# Patient Record
Sex: FEMALE | Race: WHITE | NOT HISPANIC OR LATINO | Employment: OTHER | URBAN - METROPOLITAN AREA
[De-identification: names, ages, dates, MRNs, and addresses within clinical notes are randomized per-mention and may not be internally consistent; named-entity substitution may affect disease eponyms.]

---

## 2017-01-31 ENCOUNTER — TRANSCRIBE ORDERS (OUTPATIENT)
Dept: ADMINISTRATIVE | Age: 82
End: 2017-01-31

## 2017-01-31 ENCOUNTER — APPOINTMENT (OUTPATIENT)
Dept: LAB | Age: 82
End: 2017-01-31
Payer: MEDICARE

## 2017-01-31 ENCOUNTER — ALLSCRIPTS OFFICE VISIT (OUTPATIENT)
Dept: OTHER | Facility: OTHER | Age: 82
End: 2017-01-31

## 2017-01-31 DIAGNOSIS — R53.83 OTHER FATIGUE: ICD-10-CM

## 2017-01-31 DIAGNOSIS — R10.13 EPIGASTRIC PAIN: ICD-10-CM

## 2017-01-31 LAB
ALBUMIN SERPL BCP-MCNC: 3.4 G/DL (ref 3.5–5)
ALP SERPL-CCNC: 53 U/L (ref 46–116)
ALT SERPL W P-5'-P-CCNC: 17 U/L (ref 12–78)
ANION GAP SERPL CALCULATED.3IONS-SCNC: 8 MMOL/L (ref 4–13)
AST SERPL W P-5'-P-CCNC: 10 U/L (ref 5–45)
BASOPHILS # BLD MANUAL: 0 THOUSAND/UL (ref 0–0.1)
BASOPHILS NFR MAR MANUAL: 0 % (ref 0–1)
BILIRUB SERPL-MCNC: 0.4 MG/DL (ref 0.2–1)
BUN SERPL-MCNC: 12 MG/DL (ref 5–25)
BURR CELLS BLD QL SMEAR: PRESENT
CALCIUM SERPL-MCNC: 8.8 MG/DL (ref 8.3–10.1)
CHLORIDE SERPL-SCNC: 92 MMOL/L (ref 100–108)
CO2 SERPL-SCNC: 32 MMOL/L (ref 21–32)
CREAT SERPL-MCNC: 0.53 MG/DL (ref 0.6–1.3)
EOSINOPHIL # BLD MANUAL: 0 THOUSAND/UL (ref 0–0.4)
EOSINOPHIL NFR BLD MANUAL: 0 % (ref 0–6)
ERYTHROCYTE [DISTWIDTH] IN BLOOD BY AUTOMATED COUNT: 13.9 % (ref 11.6–15.1)
GFR SERPL CREATININE-BSD FRML MDRD: >60 ML/MIN/1.73SQ M
GLUCOSE SERPL-MCNC: 123 MG/DL (ref 65–140)
HCT VFR BLD AUTO: 39.1 % (ref 34.8–46.1)
HGB BLD-MCNC: 13.1 G/DL (ref 11.5–15.4)
LG PLATELETS BLD QL SMEAR: PRESENT
LYMPHOCYTES # BLD AUTO: 0.25 THOUSAND/UL (ref 0.6–4.47)
LYMPHOCYTES # BLD AUTO: 2 % (ref 14–44)
MCH RBC QN AUTO: 29.2 PG (ref 26.8–34.3)
MCHC RBC AUTO-ENTMCNC: 33.5 G/DL (ref 31.4–37.4)
MCV RBC AUTO: 87 FL (ref 82–98)
MONOCYTES # BLD AUTO: 0.37 THOUSAND/UL (ref 0–1.22)
MONOCYTES NFR BLD: 3 % (ref 4–12)
NEUTROPHILS # BLD MANUAL: 11.71 THOUSAND/UL (ref 1.85–7.62)
NEUTS BAND NFR BLD MANUAL: 3 % (ref 0–8)
NEUTS SEG NFR BLD AUTO: 91 % (ref 43–75)
NRBC BLD AUTO-RTO: 0 /100 WBCS
OVALOCYTES BLD QL SMEAR: PRESENT
PLATELET # BLD AUTO: 379 THOUSANDS/UL (ref 149–390)
PLATELET BLD QL SMEAR: ADEQUATE
PMV BLD AUTO: 9.4 FL (ref 8.9–12.7)
POIKILOCYTOSIS BLD QL SMEAR: PRESENT
POTASSIUM SERPL-SCNC: 4 MMOL/L (ref 3.5–5.3)
PROT SERPL-MCNC: 6.7 G/DL (ref 6.4–8.2)
RBC # BLD AUTO: 4.49 MILLION/UL (ref 3.81–5.12)
RBC MORPH BLD: PRESENT
SODIUM SERPL-SCNC: 132 MMOL/L (ref 136–145)
VARIANT LYMPHS # BLD AUTO: 1 %
WBC # BLD AUTO: 12.46 THOUSAND/UL (ref 4.31–10.16)

## 2017-01-31 PROCEDURE — 36415 COLL VENOUS BLD VENIPUNCTURE: CPT

## 2017-01-31 PROCEDURE — 80053 COMPREHEN METABOLIC PANEL: CPT

## 2017-01-31 PROCEDURE — 85007 BL SMEAR W/DIFF WBC COUNT: CPT

## 2017-01-31 PROCEDURE — 85027 COMPLETE CBC AUTOMATED: CPT

## 2017-02-02 ENCOUNTER — HOSPITAL ENCOUNTER (OUTPATIENT)
Dept: RADIOLOGY | Age: 82
Discharge: HOME | End: 2017-02-02
Payer: MEDICARE

## 2017-02-02 DIAGNOSIS — R10.13 EPIGASTRIC PAIN: ICD-10-CM

## 2017-02-02 DIAGNOSIS — R53.83 OTHER FATIGUE: ICD-10-CM

## 2017-02-02 PROCEDURE — 74176 CT ABD & PELVIS W/O CONTRAST: CPT

## 2017-03-21 ENCOUNTER — ALLSCRIPTS OFFICE VISIT (OUTPATIENT)
Dept: OTHER | Facility: OTHER | Age: 82
End: 2017-03-21

## 2017-03-30 ENCOUNTER — ALLSCRIPTS OFFICE VISIT (OUTPATIENT)
Dept: OTHER | Facility: OTHER | Age: 82
End: 2017-03-30

## 2017-06-20 ENCOUNTER — ALLSCRIPTS OFFICE VISIT (OUTPATIENT)
Dept: OTHER | Facility: OTHER | Age: 82
End: 2017-06-20

## 2017-06-22 ENCOUNTER — LAB REQUISITION (OUTPATIENT)
Dept: LAB | Facility: HOSPITAL | Age: 82
End: 2017-06-22
Payer: MEDICARE

## 2017-06-22 DIAGNOSIS — R53.83 OTHER FATIGUE: ICD-10-CM

## 2017-06-22 DIAGNOSIS — E87.1 HYPO-OSMOLALITY AND HYPONATREMIA: ICD-10-CM

## 2017-06-22 DIAGNOSIS — I10 ESSENTIAL (PRIMARY) HYPERTENSION: ICD-10-CM

## 2017-06-22 DIAGNOSIS — R39.9 UNSPECIFIED SYMPTOMS AND SIGNS INVOLVING THE GENITOURINARY SYSTEM: ICD-10-CM

## 2017-06-22 LAB
ALBUMIN SERPL BCP-MCNC: 3.3 G/DL (ref 3.5–5)
ALP SERPL-CCNC: 61 U/L (ref 46–116)
ALT SERPL W P-5'-P-CCNC: 24 U/L (ref 12–78)
ANION GAP SERPL CALCULATED.3IONS-SCNC: 4 MMOL/L (ref 4–13)
AST SERPL W P-5'-P-CCNC: 16 U/L (ref 5–45)
BASOPHILS # BLD AUTO: 0.05 THOUSANDS/ΜL (ref 0–0.1)
BASOPHILS NFR BLD AUTO: 1 % (ref 0–1)
BILIRUB SERPL-MCNC: 0.3 MG/DL (ref 0.2–1)
BUN SERPL-MCNC: 15 MG/DL (ref 5–25)
CALCIUM SERPL-MCNC: 8.7 MG/DL (ref 8.3–10.1)
CHLORIDE SERPL-SCNC: 95 MMOL/L (ref 100–108)
CO2 SERPL-SCNC: 34 MMOL/L (ref 21–32)
CREAT SERPL-MCNC: 0.58 MG/DL (ref 0.6–1.3)
EOSINOPHIL # BLD AUTO: 0.12 THOUSAND/ΜL (ref 0–0.61)
EOSINOPHIL NFR BLD AUTO: 2 % (ref 0–6)
ERYTHROCYTE [DISTWIDTH] IN BLOOD BY AUTOMATED COUNT: 14.7 % (ref 11.6–15.1)
GFR SERPL CREATININE-BSD FRML MDRD: >60 ML/MIN/1.73SQ M
GLUCOSE P FAST SERPL-MCNC: 85 MG/DL (ref 65–99)
HCT VFR BLD AUTO: 37.7 % (ref 34.8–46.1)
HGB BLD-MCNC: 11.8 G/DL (ref 11.5–15.4)
LYMPHOCYTES # BLD AUTO: 1.21 THOUSANDS/ΜL (ref 0.6–4.47)
LYMPHOCYTES NFR BLD AUTO: 16 % (ref 14–44)
MCH RBC QN AUTO: 27.7 PG (ref 26.8–34.3)
MCHC RBC AUTO-ENTMCNC: 31.3 G/DL (ref 31.4–37.4)
MCV RBC AUTO: 89 FL (ref 82–98)
MONOCYTES # BLD AUTO: 1.06 THOUSAND/ΜL (ref 0.17–1.22)
MONOCYTES NFR BLD AUTO: 14 % (ref 4–12)
NEUTROPHILS # BLD AUTO: 4.85 THOUSANDS/ΜL (ref 1.85–7.62)
NEUTS SEG NFR BLD AUTO: 67 % (ref 43–75)
NRBC BLD AUTO-RTO: 0 /100 WBCS
PLATELET # BLD AUTO: 440 THOUSANDS/UL (ref 149–390)
PMV BLD AUTO: 9.1 FL (ref 8.9–12.7)
POTASSIUM SERPL-SCNC: 4.4 MMOL/L (ref 3.5–5.3)
PROT SERPL-MCNC: 6 G/DL (ref 6.4–8.2)
RBC # BLD AUTO: 4.26 MILLION/UL (ref 3.81–5.12)
SODIUM SERPL-SCNC: 133 MMOL/L (ref 136–145)
WBC # BLD AUTO: 7.39 THOUSAND/UL (ref 4.31–10.16)

## 2017-06-22 PROCEDURE — 80053 COMPREHEN METABOLIC PANEL: CPT | Performed by: INTERNAL MEDICINE

## 2017-06-22 PROCEDURE — 85025 COMPLETE CBC W/AUTO DIFF WBC: CPT | Performed by: INTERNAL MEDICINE

## 2017-07-03 ENCOUNTER — LAB REQUISITION (OUTPATIENT)
Dept: LAB | Facility: HOSPITAL | Age: 82
End: 2017-07-03
Payer: MEDICARE

## 2017-07-03 DIAGNOSIS — R39.9 UNSPECIFIED SYMPTOMS AND SIGNS INVOLVING THE GENITOURINARY SYSTEM: ICD-10-CM

## 2017-07-03 LAB
BILIRUB UR QL STRIP: NEGATIVE
CLARITY UR: CLEAR
COLOR UR: YELLOW
GLUCOSE UR STRIP-MCNC: NEGATIVE MG/DL
HGB UR QL STRIP.AUTO: NEGATIVE
KETONES UR STRIP-MCNC: NEGATIVE MG/DL
LEUKOCYTE ESTERASE UR QL STRIP: NEGATIVE
NITRITE UR QL STRIP: NEGATIVE
PH UR STRIP.AUTO: 7 [PH] (ref 4.5–8)
PROT UR STRIP-MCNC: NEGATIVE MG/DL
SP GR UR STRIP.AUTO: 1 (ref 1–1.03)
UROBILINOGEN UR QL STRIP.AUTO: 0.2 E.U./DL

## 2017-07-03 PROCEDURE — 81003 URINALYSIS AUTO W/O SCOPE: CPT | Performed by: INTERNAL MEDICINE

## 2017-07-26 ENCOUNTER — LAB REQUISITION (OUTPATIENT)
Dept: LAB | Facility: HOSPITAL | Age: 82
End: 2017-07-26
Payer: MEDICARE

## 2017-07-26 DIAGNOSIS — J44.9 CHRONIC OBSTRUCTIVE PULMONARY DISEASE (HCC): ICD-10-CM

## 2017-07-26 DIAGNOSIS — E87.1 HYPO-OSMOLALITY AND HYPONATREMIA: ICD-10-CM

## 2017-07-26 LAB
ALBUMIN SERPL BCP-MCNC: 3 G/DL (ref 3.5–5)
ALP SERPL-CCNC: 53 U/L (ref 46–116)
ALT SERPL W P-5'-P-CCNC: 22 U/L (ref 12–78)
ANION GAP SERPL CALCULATED.3IONS-SCNC: 5 MMOL/L (ref 4–13)
AST SERPL W P-5'-P-CCNC: 15 U/L (ref 5–45)
BASOPHILS # BLD AUTO: 0.05 THOUSANDS/ΜL (ref 0–0.1)
BASOPHILS NFR BLD AUTO: 0 % (ref 0–1)
BILIRUB SERPL-MCNC: 0.33 MG/DL (ref 0.2–1)
BUN SERPL-MCNC: 21 MG/DL (ref 5–25)
CALCIUM SERPL-MCNC: 8.3 MG/DL (ref 8.3–10.1)
CHLORIDE SERPL-SCNC: 97 MMOL/L (ref 100–108)
CO2 SERPL-SCNC: 30 MMOL/L (ref 21–32)
CREAT SERPL-MCNC: 0.78 MG/DL (ref 0.6–1.3)
EOSINOPHIL # BLD AUTO: 0.17 THOUSAND/ΜL (ref 0–0.61)
EOSINOPHIL NFR BLD AUTO: 1 % (ref 0–6)
ERYTHROCYTE [DISTWIDTH] IN BLOOD BY AUTOMATED COUNT: 14.6 % (ref 11.6–15.1)
GFR SERPL CREATININE-BSD FRML MDRD: 72 ML/MIN/1.73SQ M
GLUCOSE SERPL-MCNC: 98 MG/DL (ref 65–140)
HCT VFR BLD AUTO: 36.1 % (ref 34.8–46.1)
HGB BLD-MCNC: 12 G/DL (ref 11.5–15.4)
LYMPHOCYTES # BLD AUTO: 1.31 THOUSANDS/ΜL (ref 0.6–4.47)
LYMPHOCYTES NFR BLD AUTO: 10 % (ref 14–44)
MCH RBC QN AUTO: 28.7 PG (ref 26.8–34.3)
MCHC RBC AUTO-ENTMCNC: 33.2 G/DL (ref 31.4–37.4)
MCV RBC AUTO: 86 FL (ref 82–98)
MONOCYTES # BLD AUTO: 0.91 THOUSAND/ΜL (ref 0.17–1.22)
MONOCYTES NFR BLD AUTO: 7 % (ref 4–12)
NEUTROPHILS # BLD AUTO: 10.93 THOUSANDS/ΜL (ref 1.85–7.62)
NEUTS SEG NFR BLD AUTO: 82 % (ref 43–75)
NRBC BLD AUTO-RTO: 0 /100 WBCS
PLATELET # BLD AUTO: 279 THOUSANDS/UL (ref 149–390)
PMV BLD AUTO: 9.1 FL (ref 8.9–12.7)
POTASSIUM SERPL-SCNC: 4.5 MMOL/L (ref 3.5–5.3)
PROT SERPL-MCNC: 5.7 G/DL (ref 6.4–8.2)
RBC # BLD AUTO: 4.18 MILLION/UL (ref 3.81–5.12)
SODIUM SERPL-SCNC: 132 MMOL/L (ref 136–145)
WBC # BLD AUTO: 13.37 THOUSAND/UL (ref 4.31–10.16)

## 2017-07-26 PROCEDURE — 80053 COMPREHEN METABOLIC PANEL: CPT | Performed by: INTERNAL MEDICINE

## 2017-07-26 PROCEDURE — 85025 COMPLETE CBC W/AUTO DIFF WBC: CPT | Performed by: INTERNAL MEDICINE

## 2017-08-15 ENCOUNTER — ALLSCRIPTS OFFICE VISIT (OUTPATIENT)
Dept: OTHER | Facility: OTHER | Age: 82
End: 2017-08-15

## 2017-10-31 ENCOUNTER — LAB REQUISITION (OUTPATIENT)
Dept: LAB | Facility: HOSPITAL | Age: 82
End: 2017-10-31
Payer: MEDICARE

## 2017-10-31 DIAGNOSIS — E87.1 HYPO-OSMOLALITY AND HYPONATREMIA (CODE): ICD-10-CM

## 2017-10-31 LAB
ALBUMIN SERPL BCP-MCNC: 3.1 G/DL (ref 3.5–5)
ALP SERPL-CCNC: 48 U/L (ref 46–116)
ALT SERPL W P-5'-P-CCNC: 15 U/L (ref 12–78)
ANION GAP SERPL CALCULATED.3IONS-SCNC: 4 MMOL/L (ref 4–13)
AST SERPL W P-5'-P-CCNC: 13 U/L (ref 5–45)
BILIRUB SERPL-MCNC: 0.36 MG/DL (ref 0.2–1)
BUN SERPL-MCNC: 15 MG/DL (ref 5–25)
CALCIUM SERPL-MCNC: 8.7 MG/DL (ref 8.3–10.1)
CHLORIDE SERPL-SCNC: 96 MMOL/L (ref 100–108)
CO2 SERPL-SCNC: 34 MMOL/L (ref 21–32)
CREAT SERPL-MCNC: 0.62 MG/DL (ref 0.6–1.3)
GFR SERPL CREATININE-BSD FRML MDRD: 85 ML/MIN/1.73SQ M
GLUCOSE SERPL-MCNC: 81 MG/DL (ref 65–140)
POTASSIUM SERPL-SCNC: 4.7 MMOL/L (ref 3.5–5.3)
PROT SERPL-MCNC: 5.7 G/DL (ref 6.4–8.2)
SODIUM SERPL-SCNC: 134 MMOL/L (ref 136–145)

## 2017-10-31 PROCEDURE — 80053 COMPREHEN METABOLIC PANEL: CPT | Performed by: INTERNAL MEDICINE

## 2017-11-01 DIAGNOSIS — E87.1 HYPO-OSMOLALITY AND HYPONATREMIA: ICD-10-CM

## 2017-11-13 ENCOUNTER — ALLSCRIPTS OFFICE VISIT (OUTPATIENT)
Dept: OTHER | Facility: OTHER | Age: 82
End: 2017-11-13

## 2017-12-06 ENCOUNTER — ALLSCRIPTS OFFICE VISIT (OUTPATIENT)
Dept: OTHER | Facility: OTHER | Age: 82
End: 2017-12-06

## 2017-12-07 NOTE — PROGRESS NOTES
Assessment    1  COPD exacerbation (491 21) (J44 1)   2  Chronic respiratory failure with hypoxia (518 83,799 02) (J96 11)   3  Pulmonary emphysema (492 8) (J43 9)    Plan  Chronic respiratory failure with hypoxia    · PredniSONE 20 MG Oral Tablet; Take 2 tablets daily for 7 days then 1 tablet daily for7 days, then return to baseline dose as previously prescribed   Rx By: Rod Kwok; Dispense: 14 Days ; #:21 Tablet; Refill: 0;Chronic respiratory failure with hypoxia; EVER = N; Verified Transmission to [address] [facility] [address] ; Last Updated By: [employer]; 12/6/2017 4:01:53 PM    Discussion/Summary  Discussion Summary:   I suspect the John Sanabria has not really recovered from her respiratory illness from a few weeks ago  She is still feeling fatigued and somewhat run down  Her examination is difficult and I did not appreciate wheezing or a decline based on exam but given the severity of her it emphysema, the exam is unreliable  She has responded favorably to prednisone in the past and I have recommended that we increase her dose of prednisone for the next couple weeks  She will take 40 mg for a week followed by 20 mg for a week and then returned to her baseline dose of 10 mg  Hopefully this will improve her symptoms  inhalers will not be changed  She will remain on Incruse Ellipta, Xopenex nebulizers 3 times daily, and she will also continue with her regular use of her supplemental oxygen  and her daughter in agreement with the plan  I will have her return to see me in 6 months  She will call me if she is not feeling better and I would be happy to see her sooner if necessary  I appreciate the opportunity to participate in her care  Please do not hesitate to call me with any questions or concerns  Goals and Barriers: The patient has the current Goals: Improved breathing and fatigue  The patent has the current Barriers: Severe emphysema with exacerbation     Patient's Capacity to Self-Care: Patient is unable to Self-Care: Lives with her daughter  Patient agrees and allows to involve family/caregiver in development of care plan:   Medication SE Review and Pt Understands Tx: Possible side effects of new medications were reviewed with the patient/guardian today  The treatment plan was reviewed with the patient/guardian  The patient/guardian understands and agrees with the treatment plan   Self Referrals:   Self Referrals: No      Active Problems    1  Acute non-recurrent maxillary sinusitis (461 0) (J01 00)   2  Allergy (995 3) (T78 40XA)   3  Anxiety (300 00) (F41 9)   4  Ataxia (781 3) (R27 0)   5  Chronic respiratory failure with hypoxia (518 83,799 02) (J96 11)   6  COPD, severe (496) (J44 9)   7  Decreased hearing, left (389 9) (H91 92)   8  Depression (311) (F32 9)   9  Fatigue (780 79) (R53 83)   10  GERD (gastroesophageal reflux disease) (530 81) (K21 9)   11  Herpes zoster (053 9) (B02 9)   12  Hypertension (401 9) (I10)   13  Hypochloremia (276 9) (E87 8)   14  Hyponatremia (276 1) (E87 1)   15  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)   16  Pulmonary emphysema (492 8) (J43 9)   17  Seizure (780 39) (R56 9)   18  UTI symptoms (788 99) (R39 9)    History of Present Illness  HPI: Georgette Sever is here today for a pulmonary follow-up  She has severe advanced emphysema with chronic hypoxemic respiratory failure  Up until fairly recently she has doing reasonably well  She does require 2-3 L of supplemental oxygen  On her portable supply she uses 3 L but at home she is using 2-1/2 on most occasions  a few weeks ago, she developed a respiratory illness and was treated with Levaquin  She has not felt all that well since  She does cough a lot and bring up clear mucus  She did has not persisted in having fever, chills, or other additional symptoms  Her predominant complaint has been significant fatigue  She has been sleeping much more than usual and has not felt back to her baseline   She does feel that her breathing has been a bit more labored and she has no energy  does not have any chest pain or palpitations  She was found to have low sodium levels and her diet was slightly modified as well as her Paxil dose which was felt to be contributing to the hyponatremia  This has since improved somewhat and she has an upcoming repeat blood work for December  remains in fairly good spirits  She is somewhat frustrated with the decline in her breathing since she had been doing well  She does freely admits that she is much better off than she was a year and a half ago however      Review of Systems  Complete-Female - Pulm:  Constitutional: as noted in HPI  Eyes: no complaints of vision problems  ENT: no rhinitis, no PND, no epistaxis  Cardiovascular: as noted in HPI  Respiratory: as noted in HPI  Gastrointestinal: no complaints of esophageal reflux, no abdominal pain  Genitourinary: no dysuria  Musculoskeletal: no arthralgias, no joint swelling, no myalgias  Integumentary: as noted in HPI  Neurological: no headache, no fainting, no weakness  Psychiatric: no anxiety, no depression  Hematologic/Lymphatic: â no complaints of swollen glands  Past Medical History    1  History of Abdominal pain, epigastric (789 06) (R10 13)   2  History of Appendicitis (541)   3  History of Bilateral pulmonary infiltrates on CXR (793 19) (R91 8)   4  History of Constipation, acute (564 00) (K59 00)   5  History of Laceration of right ring finger w/o foreign body w/o damage to nail (883 0) (S61 214A)   6  History of Need for pneumococcal vaccination (V03 82) (Z23)   7  History of Parotitis, acute (527 2) (K11 21)   8  History of URTI (acute upper respiratory infection) (465 9) (J06 9)    Surgical History  1  History of Appendectomy   2  History of Hysterectomy  Surgical History Reviewed: The surgical history was reviewed and updated today  Family History  Father    1   Family history of cardiac disorder (V17 49) (Z82 49)  Family History Reviewed: The family history was reviewed and updated today  Social History     · Current every day smoker (305 1) (F17 200)   · 2 to 3 cigarettes a day  Has been smoking for 60 years    Current Meds   1  ALPRAZolam 0 25 MG Oral Tablet; take 1 tablet by mouth every 6 to 8 hours if needed; Therapy: 95NVN4170 to (Evaluate:18Oct2017)  Requested for: 61Mzs4535; Last Rx:90Bzq5265 Ordered   2  Incruse Ellipta 62 5 MCG/INH Inhalation Aerosol Powder Breath Activated; USE 1 INHALATION ONCE DAILY; Therapy: 23FDL3967 to (Evaluate:16Jan2018)  Requested for: 20Jun2017; Last Rx:20Jun2017 Ordered   3  Levalbuterol HCl - 0 63 MG/3ML Inhalation Nebulization Solution; USE 1 UNIT DOSE IN NEBULIZER EVERY 4 HOURS AS NEEDED; Therapy: 93CAJ8302 to (Evaluate:75Jph8635)  Requested for: 32Kzf6280; Last Rx:45Bev9598 Ordered   4  Losartan Potassium 100 MG Oral Tablet; TAKE 1 TABLET DAILY  Requested for: 20Jun2017; Last Rx:20Jun2017 Ordered   5  Morphine Sulfate (Concentrate) 20 MG/ML SOLN; TAKE 0 5 ML Every 6 hours PRN; Therapy: 71VRS8012 to (Evaluate:29Apr2017); Last Rx:30Mar2017 Ordered   6  Multi Vitamin Daily TABS; Therapy: (Recorded:12Lpc9725) to Recorded   7  PARoxetine HCl - 10 MG Oral Tablet; take 2 tablet daily; Therapy: 35YRV4716 to (Evaluate:11Jun2018)  Requested for: 15USE8562; Last Rx:13Nov2017 Ordered   8  Pepcid AC 10 MG Oral Tablet; Therapy: 16DCO0519 to Recorded   9  PredniSONE 10 MG Oral Tablet; take 1 tablet by mouth daily; Therapy: 64MQM4996 to (Evaluate:87Hwb5120)  Requested for: 50LUJ2847; Last Rx:30Mar2017 Ordered   10  ProAir  (90 Base) MCG/ACT Inhalation Aerosol Solution; inhale 2 puffs by mouth  every 6 hours if needed; Therapy: 98JYL9277 to (Alexei Byers)  Requested for: 20Jun2017; Last  Rx:20Iun2409 Ordered   11  RA Stool Softener CAPS Recorded   12  Tessalon Perles 100 MG Oral Capsule; TAKE 1 CAPSULE TWICE DAILY AS NEEDED; Therapy: 97JBP2806 to Recorded   13  Verapamil HCl  MG Oral Tablet Extended Release; TAKE 1 TABLET BY MOUTH  ONCE DAILY; Therapy: 55TMO5321 to (Marycruz Caller)  Requested for: 54GZW5514; Last  Rx:29Nov2017 Ordered   14  Vitamin C CAPS; TAKE 1 CAPSULE DAILY; Therapy: (Recorded:28Jan2016) to Recorded  Medication List Reviewed: The medication list was reviewed and updated today  Allergies  1  Spiriva HandiHaler CAPS   2  Sulfa Drugs    Vitals  Vital Signs    Recorded: 10WLE0753 03:44PM   Temperature 97 6 F   Heart Rate 90   Respiration 18   Systolic 262   Diastolic 60   Height 5 ft    Weight 118 lb    BMI Calculated 23 05   BSA Calculated 1 49   O2 Saturation 95, Nasal Cannula   FiO2 3L/min, Nasal Cannula       Physical Exam   Constitutional  General appearance: Abnormal   chronically ill-- and-- appears tired  Ears, Nose, Mouth, and Throat  Nasal mucosa, septum, and turbinates: Normal without edema or erythema  Lips, teeth, and gums: Normal, good dentition  Oropharynx: Normal with no erythema, edema, exudate or lesions  Neck  Neck: Supple, symmetric, trachea midline, no masses  Jugular veins: Normal    Pulmonary  Chest: Abnormal   Inspection: overinflated chest  Diminished respiratory excursion bilaterally  Percussion of chest: Abnormal   Percussion of the lungs revealed hyperresonance  Auscultation of lungs: Abnormal   Auscultation of the lungs revealed decreased breath sounds diffusely  no rales or crackles were heard bilaterally  no wheezing  Cardiovascular  Auscultation of heart: Normal rate and rhythm, normal S1 and S2, no murmurs  Examination of extremities for edema and/or varicosities: Normal    Abdomen  Abdomen: Soft, non-tender  Lymphatic  Palpation of lymph nodes in neck: No lymphadenopathy  Musculoskeletal  Gait and station: Normal    Digits and nails: Normal without clubbing or cyanosis  Neurologic  Mental Status: Normal  Not confused, no evidence of dementia, good comprehension, good concentration  Skin  Skin and subcutaneous tissue: Limited exam shows no rash  Psychiatric  Orientation to person, place and time: Normal    Mood and affect: Normal  -- Affect today's visit was considerably brighter than prior visit  Future Appointments    Date/Time Provider Specialty Site   03/15/2018 04:00 PM LATOYA Toure   Internal Medicine [facility] INTERNAL MED       Signatures   Electronically signed by : LATOYA Mckenzie ; Dec  6 2017  4:13PM EST                       (Author)

## 2017-12-11 DIAGNOSIS — E87.1 HYPO-OSMOLALITY AND HYPONATREMIA (CODE): ICD-10-CM

## 2017-12-12 ENCOUNTER — LAB REQUISITION (OUTPATIENT)
Dept: LAB | Facility: HOSPITAL | Age: 82
End: 2017-12-12
Payer: MEDICARE

## 2017-12-12 DIAGNOSIS — E87.1 HYPO-OSMOLALITY AND HYPONATREMIA (CODE): ICD-10-CM

## 2017-12-12 LAB
ALBUMIN SERPL BCP-MCNC: 3.1 G/DL (ref 3.5–5)
ALP SERPL-CCNC: 60 U/L (ref 46–116)
ALT SERPL W P-5'-P-CCNC: 17 U/L (ref 12–78)
ANION GAP SERPL CALCULATED.3IONS-SCNC: 8 MMOL/L (ref 4–13)
AST SERPL W P-5'-P-CCNC: 15 U/L (ref 5–45)
BILIRUB SERPL-MCNC: 0.36 MG/DL (ref 0.2–1)
BUN SERPL-MCNC: 20 MG/DL (ref 5–25)
CALCIUM SERPL-MCNC: 8.6 MG/DL (ref 8.3–10.1)
CHLORIDE SERPL-SCNC: 93 MMOL/L (ref 100–108)
CO2 SERPL-SCNC: 28 MMOL/L (ref 21–32)
CREAT SERPL-MCNC: 0.67 MG/DL (ref 0.6–1.3)
GFR SERPL CREATININE-BSD FRML MDRD: 83 ML/MIN/1.73SQ M
GLUCOSE SERPL-MCNC: 174 MG/DL (ref 65–140)
POTASSIUM SERPL-SCNC: 4.8 MMOL/L (ref 3.5–5.3)
PROT SERPL-MCNC: 6.1 G/DL (ref 6.4–8.2)
SODIUM SERPL-SCNC: 129 MMOL/L (ref 136–145)

## 2017-12-12 PROCEDURE — 80053 COMPREHEN METABOLIC PANEL: CPT | Performed by: INTERNAL MEDICINE

## 2017-12-21 ENCOUNTER — GENERIC CONVERSION - ENCOUNTER (OUTPATIENT)
Dept: OTHER | Facility: OTHER | Age: 82
End: 2017-12-21

## 2017-12-21 ENCOUNTER — GENERIC CONVERSION - ENCOUNTER (OUTPATIENT)
Dept: PULMONOLOGY | Facility: HOSPITAL | Age: 82
End: 2017-12-21

## 2018-01-02 ENCOUNTER — GENERIC CONVERSION - ENCOUNTER (OUTPATIENT)
Dept: OTHER | Facility: OTHER | Age: 83
End: 2018-01-02

## 2018-01-11 NOTE — MISCELLANEOUS
Message  Message Free Text Note Form: Patient called today complaining of problems with a cough and mild upper respiratory tract symptoms  Apparently patient is on palliative care and has a history of chronic obstructive pulmonary disease  The patient is unable to make it into the office for evaluation  She was placed on a Zithromax Z-Rogelio and also tapering dose of oral prednisone  She was instructed to call the office if she's not improving and that there is a doctor on call 24 hours a day 7 days a week to answer any questions or help with any problems  Plan    1  Azithromycin 250 MG Oral Tablet; Take as directed per package instructions   2  PredniSONE 10 MG Oral Tablet; Two pills twice daily for two days,than one pill twice   a day for two days, than one pill daily till finished    3   ProAir  (90 Base) MCG/ACT Inhalation Aerosol Solution; inhale 2 puffs by   mouth every 6 hours if needed    Signatures   Electronically signed by : Willian Banda DO; Sep 22 2016  6:50PM EST                       (Author)

## 2018-01-11 NOTE — MISCELLANEOUS
Message  Return to work or school:   Pako Beaver is under my professional care  She was seen in my office on 11/13/17       Heidi Brenda had a doctor appointment on Monday 11/13/17  LATOYA Mendez        Signatures   Electronically signed by : Magali Soni, ; Nov 13 2017  3:29PM EST                       (Author)

## 2018-01-12 VITALS
TEMPERATURE: 99.1 F | WEIGHT: 118.05 LBS | HEIGHT: 60 IN | BODY MASS INDEX: 23.18 KG/M2 | DIASTOLIC BLOOD PRESSURE: 74 MMHG | SYSTOLIC BLOOD PRESSURE: 128 MMHG | OXYGEN SATURATION: 98 % | HEART RATE: 93 BPM | RESPIRATION RATE: 14 BRPM

## 2018-01-12 VITALS
DIASTOLIC BLOOD PRESSURE: 68 MMHG | WEIGHT: 110 LBS | HEART RATE: 110 BPM | OXYGEN SATURATION: 97 % | BODY MASS INDEX: 21.6 KG/M2 | TEMPERATURE: 97.9 F | SYSTOLIC BLOOD PRESSURE: 130 MMHG | HEIGHT: 60 IN | RESPIRATION RATE: 20 BRPM

## 2018-01-13 VITALS
SYSTOLIC BLOOD PRESSURE: 130 MMHG | HEART RATE: 106 BPM | RESPIRATION RATE: 16 BRPM | DIASTOLIC BLOOD PRESSURE: 66 MMHG | BODY MASS INDEX: 21.73 KG/M2 | WEIGHT: 111.25 LBS | TEMPERATURE: 98.7 F | OXYGEN SATURATION: 98 %

## 2018-01-13 VITALS
RESPIRATION RATE: 16 BRPM | OXYGEN SATURATION: 97 % | TEMPERATURE: 98.7 F | HEART RATE: 95 BPM | DIASTOLIC BLOOD PRESSURE: 72 MMHG | WEIGHT: 106.25 LBS | HEIGHT: 60 IN | SYSTOLIC BLOOD PRESSURE: 138 MMHG | BODY MASS INDEX: 20.86 KG/M2

## 2018-01-13 NOTE — MISCELLANEOUS
Assessment    1  UTI symptoms (788 99) (R39 9)   2  Anxiety (300 00) (F41 9)   3  COPD, severe (496) (J44 9)   4  Depression (311) (F32 9)   5  Hypertension (401 9) (I10)   6  Hyponatremia (276 1) (E87 1)   7  Seizure (780 39) (R56 9)   8  Decreased hearing, left (389 9) (H91 92)    Plan  Anxiety, Depression    · From  PARoxetine HCl - 20 MG Oral Tablet take 1 tablet by mouth once daily To  PARoxetine HCl - 10 MG Oral Tablet Take 1 tablet daily   Rx By: Alicia Tapia; Dispense: 30 Days ; #:30 Tablet; Refill: 6; For: Anxiety, Depression; EVER = N; Record  COPD, severe    · Incruse Ellipta 62 5 MCG/INH Inhalation Aerosol Powder Breath Activated; USE 1  INHALATION ONCE DAILY   Rx By: Alicia Tapia; Dispense: 30 Days ; #:1 X 30 Aerosol Powder Breath Activated Disp Pack; Refill: 6; For: COPD, severe; EVER = N; Verified Transmission to [address] [facility] [address] ; Last Updated By: System, SureScripts; 2017 4:10:47 PM  Hypertension    · Losartan Potassium 100 MG Oral Tablet; TAKE 1 TABLET DAILY   Rx By: Alicia Tapia; Dispense: 30 Days ; #:30 Tablet; Refill: 5; For: Hypertension; EVER = N; Verified Transmission to [address] [address] ; Last Updated By: System, SureScripts; 2017 4:10:45 PM   · (1) CBC/PLT/DIFF; Status:Active; Requested [id number];    Perform:[facility]; STB:[id number]; Ordered;  For:Hypertension; Ordered By:Dominic Lester;   · (1) COMPREHENSIVE METABOLIC PANEL; Status:Active; Requested [id number];    Perform:[facility]; UTI:[id number]; Ordered;  For:Hypertension; Ordered By:Dominic Lester;  Pulmonary emphysema    · ProAir  (90 Base) MCG/ACT Inhalation Aerosol Solution; inhale 2 puffs by  mouth every 6 hours if needed   Rx By: Alicia Tapia; Dispense: 24 Days ; #:1 X 8 5 GM Inhaler; Refill: 6; For: Pulmonary emphysema; EVER = N; Verified Transmission to [address] [facility] [address] ; Msg to Pharmacy: brand necessary;  Last Updated By: System, Monitor; 6/20/2017 4:10:46 PM  UTI symptoms    · Cefdinir 300 MG Oral Capsule; TAKE 1 CAPSULE EVERY 12 HOURS DAILY   Rx By: Esvin Na; Dispense: 7 Days ; #:14 Capsule; Refill: 0; For: UTI symptoms; EVER = N; Verified Transmission to [address] [facility] [address] ; Last Updated By: System, LUXA; 6/20/2017 4:20:20 PM   · (1) URINE CULTURE; Source:Urine, Clean Catch; Status:Active; Requested  DYO:[id number];    Perform:[facility]; XVE:[id number]; Ordered; For:UTI symptoms; Ordered By:Dominic Lester;    #1 seizure events secondary to hyponatremia and possibly urinary tract infection  No further events since hyponatremia was corrected in the hospital  The patient underwent a CT scan of her brain in the hospital which revealed no evidence of any acute pathology  She had an EEG performed which did not show any focal slowing or epileptiform changes poor  #2 hyponatremia possibly secondary to peroxide teen medication  The patient is on this medication for anxiety and depression control  I indicated that I think would be best to slowly taper her off of these Paxil medication and she has been on it for an extended period of time  We'll decrease her medication dose from 20 mg a day to 10 mg a day and see her in one month at which time we'll consider decreasing it to 5 mg daily  Ordered a CBC and a CMP to follow up patient's urinary tract infection as well as hyponatremia  She also was diagnosed with a urinary tract infection during the course of her hospitalization which may have been a contributing factor to his seizure episode  Anxiety and depression symptos will monitor these axil medication is tapered  #4 hypertension adequately controlled at this time no change to medications      #5 decreased hearing in the left ear status post seizure event etiology of this is uncertain at the present time there was no abnormality on the CT of the brain nothing abnormal on examination of the external auditory canal     #6 urinary tract infection in the hospital the patient has an antibiotic course of Omnicef 500 mg twice a day which she will complete  We've ordered a follow-up urine culture  Patient one month for follow-up assessment  Discussion/Summary  In summary this is a transition of care visit for this 52-year-old female patient she was admitted to the [facility] for seizure episode believed to be secondary to a combination of hyponatremia and UTI  We've addressed these issues scheduled follow-up testing as well as a follow-up visit  Chief Complaint  Chief Complaint Free Text Note Form: This is a transition of care visit for this 52-year-old female patient  She presents today having been admitted to the [facility] between June 6, 2017 and June 9, 2017  Her diagnosis on discharge is new onset seizure activity urinary tract infection and hyponatremia  She also has chronic problems of end-stage COPD hypertension anxiety and depression  History of Present Illness  [facility] Communication [facility]: The patient is being contacted for follow-up after hospitalization  She was hospitalized at [facility]  The date of admission: 6/6/17, date of discharge: 6/9/17  Diagnosis: Onset Seizures  She was discharged to home  She scheduled a follow up appointment  Counseling was provided to the patient  Communication performed and completed by Franny Maier   HPI: Patient appointment scheduled for 6/20/17 this 52-year-old female patient presents today for follow-up transition of care visit  She was hospitalized at the [address] [place] [address] for a seizure event believed to be triggered by hyponatremia and a urinary tract infection  She did not have any loss of sphincter control but became for several minutes  The seizure resolved spontaneously  She had hyponatremia on admission to the hospital with a reading of 125   He was also noted to have a urinary tract infection and was treated with Omnicef 500 mg twice a day  Since the event of the seizure activity the patient indicates that she has lost her hearing in her left ear  This patient also has a history of chronic end-stage COPD hypertension depression and anxiety  She is presently on treatment for her anxiety and depression with Paxil medication  It is possible that the Paxil medication may be the cause for her hyponatremia and we've embarked on a slow taper of the medication  Review of Systems  Complete-Female:   Constitutional: feeling tired, but no fever and no chills  Eyes: No complaints of eye pain, no red eyes, no eyesight problems, no discharge, no dry eyes, no itching of eyes  ENT: Decreased hearing acuity in the left ear status post seizure event hyponatremia, but as noted in HPI  Cardiovascular: No complaints of slow heart rate, no fast heart rate, no chest pain, no palpitations, no leg claudication, no lower extremity edema  Respiratory: shortness of breath and shortness of breath during exertion  Gastrointestinal: No complaints of abdominal pain, no constipation, no nausea or vomiting, no diarrhea, no bloody stools  Genitourinary: No complaints of dysuria, no incontinence, no pelvic pain, no dysmenorrhea, no vaginal discharge or bleeding  Integumentary: No complaints of skin rash or lesions, no itching, no skin wounds, no breast pain or lump  Neurological: No complaints of headache, no confusion, no convulsions, no numbness, no dizziness or fainting, no tingling, no limb weakness, no difficulty walking  Psychiatric: anxiety and depression  Endocrine: No complaints of proptosis, no hot flashes, no muscle weakness, no deepening of the voice, no feelings of weakness  Hematologic/Lymphatic: No complaints of swollen glands, no swollen glands in the neck, does not bleed easily, does not bruise easily  Active Problems    1  Allergy (995 3) (T78 40XA)   2   Anxiety (300 00) (F41 9)   3  Ataxia (781 3) (R27 0)   4  COPD, severe (496) (J44 9)   5  Depression (311) (F32 9)   6  Fatigue (780 79) (R53 83)   7  GERD (gastroesophageal reflux disease) (530 81) (K21 9)   8  Herpes zoster (053 9) (B02 9)   9  Hypertension (401 9) (I10)   10  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)   11  Pulmonary emphysema (492 8) (J43 9)    Past Medical History    1  History of Abdominal pain, epigastric (789 06) (R10 13)   2  History of Appendicitis (541)   3  History of Bilateral pulmonary infiltrates on CXR (793 19) (R91 8)   4  History of Constipation, acute (564 00) (K59 00)   5  History of Laceration of right ring finger w/o foreign body w/o damage to nail (883 0)   (S61 214A)   6  History of Need for pneumococcal vaccination (V03 82) (Z23)   7  History of Parotitis, acute (527 2) (K11 21)   8  History of URTI (acute upper respiratory infection) (465 9) (J06 9)    Surgical History    1  History of Appendectomy   2  History of Hysterectomy    Family History  Father    1  Family history of cardiac disorder (V17 49) (Z82 49)    Social History    · Current every day smoker (305 1) (F17 200)    Current Meds   1  ALPRAZolam 0 25 MG Oral Tablet; take 1 tablet every 6 to 8 hours as needed; Therapy: 59GXY2214 to (Evaluate:39Qlp4966); Last Rx:10Jan2017 Ordered   2  Anoro Ellipta 62 5-25 MCG/INH Inhalation Aerosol Powder Breath Activated; INHALE 1   PUFFS Daily; Therapy: 97PRT4871 to (Evaluate:53Jnt0968); Last Rx:30Mar2017 Ordered   3  Incruse Ellipta 62 5 MCG/INH Inhalation Aerosol Powder Breath Activated; USE 1   INHALATION ONCE DAILY; Therapy: 18NUD3647 to (Evaluate:20Tpq2191); Last Rx:30Mar2017 Ordered   4  Levalbuterol HCl - 0 63 MG/3ML Inhalation Nebulization Solution; USE 1 UNIT DOSE IN   NEBULIZER EVERY 4 HOURS AS NEEDED; Therapy: 84WHP1092 to (Evaluate:61Ocs0254)  Requested for: 73Pkj9418; Last   Rx:13Ycu6024 Ordered   5   Losartan Potassium 100 MG Oral Tablet; TAKE 1 TABLET DAILY Requested for:   19Wxn1357; Last Rx:19Dec2016 Ordered   6  Morphine Sulfate (Concentrate) 20 MG/ML SOLN; TAKE 0 5 ML Every 6 hours PRN; Therapy: 03OCS2243 to (Evaluate:29Apr2017); Last Rx:30Mar2017 Ordered   7  Multi Vitamin Daily TABS; Therapy: (Recorded:58Bcr2695) to Recorded   8  PARoxetine HCl - 20 MG Oral Tablet; take 1 tablet by mouth once daily; Therapy: 71DJE9023 to (Evaluate:65Xio2709)  Requested for: 97OLV9815; Last   Rx:14Lto0826 Ordered   9  PredniSONE 10 MG Oral Tablet; take 1 tablet by mouth daily; Therapy: 76MIQ5934 to (Evaluate:04Jyk4567)  Requested for: 54LOI6039; Last   Rx:30Mar2017 Ordered   10  ProAir  (90 Base) MCG/ACT Inhalation Aerosol Solution; inhale 2 puffs by mouth    every 6 hours if needed; Therapy: 64TWU2101 to (Emerson Elaine)  Requested for: 21Mar2017; Last    Rx:21Mar2017 Ordered   11  RA Stool Softener CAPS Recorded   12  Tessalon Perles 100 MG Oral Capsule; TAKE 1 CAPSULE TWICE DAILY AS NEEDED; Therapy: 95JSW6458 to Recorded   13  Verapamil HCl  MG Oral Tablet Extended Release; TAKE 1 TABLET BY MOUTH    ONCE DAILY; Therapy: 71CQR3543 to (Evaluate:19Nzv1471)  Requested for: 14FCR0210; Last    Rx:17Nov2016 Ordered   14  Vitamin C CAPS; TAKE 1 CAPSULE DAILY; Therapy: (Recorded:28Jan2016) to Recorded    Allergies    1  Spiriva HandiHaler CAPS   2  Sulfa Drugs    Vitals  Signs   Recorded: 20Jun2017 03:24PM   Temperature: 98 9 F  Heart Rate: 90  Respiration: 16  Systolic: 474  Diastolic: 68  Height: 5 ft   O2 Saturation: 98    Physical Exam    Constitutional   General appearance: No acute distress, well appearing and well nourished  Eyes   Conjunctiva and lids: No swelling, erythema or discharge  Ears, Nose, Mouth, and Throat   External inspection of ears and nose: Normal     Otoscopic examination: Tympanic membranes translucent with normal light reflex  Canals patent without erythema      Nasal mucosa, septum, and turbinates: Normal without edema or erythema  Oropharynx: Normal with no erythema, edema, exudate or lesions  Pulmonary   Respiratory effort: Abnormal   Increased respiratory effort  Auscultation of lungs: Abnormal   Very slight expiratory wheeze  Cardiovascular   Auscultation of heart: Normal rate and rhythm, normal S1 and S2, without murmurs  Examination of extremities for edema and/or varicosities: Normal     Carotid pulses: Normal     Neurologic   Cranial nerves: Cranial nerves 2-12 intact  Sensation: No sensory loss  Psychiatric   Orientation to person, place, and time: Normal     Mood and affect: Normal          Future Appointments    Date/Time Provider Specialty Site   07/20/2017 05:30 PM LATOYA Kennedy  Internal Medicine [facility] INTERNAL MED     Signatures   Electronically signed by :  Clemencia Wolf, ; Jun 20 2017  4:01PM EST                       (Author)    Electronically signed by : LATOYA Watts ; Jun 21 2017  7:39PM EST                       (Author)

## 2018-01-14 VITALS
HEIGHT: 60 IN | WEIGHT: 112.03 LBS | BODY MASS INDEX: 22 KG/M2 | HEART RATE: 97 BPM | RESPIRATION RATE: 16 BRPM | SYSTOLIC BLOOD PRESSURE: 138 MMHG | OXYGEN SATURATION: 97 % | DIASTOLIC BLOOD PRESSURE: 70 MMHG | TEMPERATURE: 98.7 F

## 2018-01-15 VITALS
SYSTOLIC BLOOD PRESSURE: 142 MMHG | HEART RATE: 90 BPM | RESPIRATION RATE: 16 BRPM | HEIGHT: 60 IN | TEMPERATURE: 98.9 F | OXYGEN SATURATION: 98 % | DIASTOLIC BLOOD PRESSURE: 68 MMHG

## 2018-01-15 NOTE — PROGRESS NOTES
Assessment    1  Emphysema/COPD (492 8) (J43 9)   2  Current every day smoker (305 1) (F17 200)   3  Chronic respiratory failure with hypoxia (863 06,470 89) (J96 11)    Plan  Chronic respiratory failure with hypoxia, Emphysema/COPD    · Six Minute Walk Test - POC; Status:Complete;   Done: 75AFB4948   Perform: In Office; XEY:97RDA1761;YKZFVSC; For:Chronic respiratory failure with hypoxia, Emphysema/COPD; Ordered By:Van Mckeon;   · Modify Home Oxygen; Status:Complete;   Done: 68RWB8264 10:28AM   Perform:Not Applicable; Order Comments:Change Home O2 to 2 L continuous by nasal cannula at rest and during sleep, 4 L by nasal cannula with exertion  Please evaluate the patient for a conserving device to maintain an spO2 > than 90% with 1-5 L of pulse O2  Provide POC if patient qualifies  ; LGT:62DHJ5904; Last Updated By:Janet Hayward; 1/28/2016 10:33:54 AM;Ordered; For:Chronic respiratory failure with hypoxia, Emphysema/COPD; Ordered By:Van Mckeon;  SocHx: Current every day smoker    · We recommend you quit smoking  Time spent counseling today was greater than 3  minutes ; Status:Complete;   Done: 92MFE0549   Ordered; For:SocHx: Current every day smoker; Ordered By:Desirae Mckeon;    Results/Data  Results Free Text Form [facility]:   Results   CT Scan I personally reviewed her CAT scan of the chest on the [facility] system from the hospital stay  Based on the findings, I have not recommended further follow-up  There was no evidence of pulmonary embolism  The minor infiltrative findings were likely inflammatory related to her acute illness  PFT Results v2:     Spirometry:   Post Bronchodilator Spirometry:   Lung Volumes:   DLCO:    PFT Interpretation:   Review performed a 6 minute walk test in the office today  She has demonstrated significant exertional hypoxemia  Her oxygen levels declined from 92% on room air to 83% on room air with ambulation  She subsequently placed on supplemental oxygen at 3 L   Despite the 3 L she did decline to 87%  At 4 L she was able to maintain saturations of 92%  Discussion/Summary  Discussion Summary:   Guicho Walters has improved from a symptomatology standpoint since the hospital discharge  She will continue on Tudorza  I have given her samples today  Unfortunately she cannot afford to maintain a Arboleda Flaming prescription if not covered under her insurance  I have recommended that she try Spiriva Respimat  This would likely be covered under her insurance plan and therefore could replace the Arboleda Flaming if she tolerates it  I suspect that the cough associated with the Spiriva HandiHaler was related to the dry powder  If she tolerates the Respimat, we will order this for her  Otherwise, we will likely try to obtain a prior authorization for her to continue on Tudorza  Based on her 6 minute walking test today, she should have portable oxygen  I have modified her home oxygen prescription and submitted this to her DME provider  Her CAT scan does not require further follow-up  She will see me in the office in follow-up in 3 months  We did review a POLST document today  She wishes to be DO NOT RESUSCITATE and DO NOT INTUBATE  A copy of this document has been placed on the medical record         Active Problems    1  Anxiety (300 00) (F41 9)   2  Chronic respiratory failure with hypoxia (518 83,799 02) (J96 11)   3  Depression (311) (F32 9)   4  Emphysema/COPD (492 8) (J43 9)   5  Hypertension (401 9) (I10)   6  Laceration of right ring finger w/o foreign body w/o damage to nail (883 0) (K68 133W)    History of Present Illness  HPI: It is my pleasure to see regarding follow-up of her hospitalization at [facility]  She was admitted with an exacerbation of COPD and acute on chronic hypoxemic respiratory failure  During the hospital stay she did have a pulmonary embolism protocol CAT scan which was negative for pulmonary embolism  There were minor parenchymal pulmonary opacities   Since hospital discharge she has been followed by the COPD team and has visiting nurses coming to the home  She is on supplemental oxygen at nighttime but does not have daytime her portable oxygen established  She does feel her shortness of breath has been considerably better since hospital discharge  She is on [place] and prednisone  Unfortunately [place] is not covered on her formulary and Spiriva is the formulary equivalent  She had a significant cough related to the Spiriva HandiHaler  It was discontinued because of side effects  She has not tried the Respimat however  She does deny wheezing or chest tightness  She does get short of breath with exertion  Even with short distances of walking she notices significant shortness of breath  She does continue to smoke  She is smoking one to 2 cigarettes a day  She smoked much more heavily at the time of the hospital stay  Review of Systems  Complete-Female - Pulm:   Constitutional: as noted in HPI  Eyes: no complaints of vision problems  ENT: no rhinitis, no PND, no epistaxis  Cardiovascular: no palpitations, no chest pain  Respiratory: as noted in HPI  Gastrointestinal: no complaints of esophageal reflux, no abdominal pain  Genitourinary: no dysuria  Musculoskeletal: no arthralgias, no joint swelling, no myalgias  Integumentary: no rash, no lesions  Neurological: no headache, no fainting, no weakness  Psychiatric: no anxiety, no depression  Hematologic/Lymphatic: - no complaints of swollen glands  Past Medical History    1  History of Appendicitis (541)   2  History of Bilateral pulmonary infiltrates on CXR (793 19) (R91 8)    Surgical History    1  History of Appendectomy   2  History of Hysterectomy  Surgical History Reviewed: The surgical history was reviewed and updated today  Family History    1  Family history of cardiac disorder (V17 49) (Z82 49)  Family History Reviewed: The family history was reviewed and updated today         Social History    · Current every day smoker (305 1) (F17 200)  Social History Reviewed: The social history was reviewed and updated today  Current Meds   1  Levalbuterol HCl - 0 63 MG/3ML Inhalation Nebulization Solution; USE 1 UNIT DOSE IN   NEBULIZER EVERY 4 HOURS AS NEEDED; Therapy: 75RYQ7900 to Recorded   2  Losartan Potassium 100 MG Oral Tablet; TAKE 1 TABLET DAILY; Therapy: (Helen Lackey) to Recorded   3  Pantoprazole Sodium 40 MG Oral Tablet Delayed Release; TAKE 1 TABLET DAILY; Therapy: (Recorded:28Mar2014) to Recorded   4  PARoxetine HCl - 20 MG Oral Tablet; TAKE 1 TABLET DAILY; Therapy: (Recorded:28Mar2014) to Recorded   5  PredniSONE 5 MG Oral Tablet; Take 1 tablet daily; Therapy: 36SDJ8009 to Recorded   6  Tessalon Perles 100 MG Oral Capsule; TAKE 1 CAPSULE TWICE DAILY AS NEEDED; Therapy: 71MWZ6780 to Recorded   7  Robert Slate Pressair 400 MCG/ACT Inhalation Aerosol Powder Breath Activated; INHALE 1   PUFFS Twice daily; Therapy: 21AQL7012 to Recorded   8  Verapamil HCl  MG Oral Tablet Extended Release; TAKE 1 TABLET DAILY; Therapy: 07LEZ6484 to Recorded   9  Vitamin C CAPS; TAKE 1 CAPSULE DAILY; Therapy: (Helen Lackey) to Recorded   10  Xanax 0 5 MG Oral Tablet; 1/2- 1 tablet prn; Therapy: (Recorded:28Jan2016) to Recorded  Medication List Reviewed: The medication list was reviewed and updated today  Allergies    1  Spiriva HandiHaler CAPS   2  Sulfa Drugs    Vitals  Vital Signs [Data Includes: Current Encounter]    Recorded: 77FKG2834 09:38AM   Temperature 98 3 F   Heart Rate 74   Respiration 16   Systolic 281   Diastolic 70   Height 5 ft    Weight 110 lb    BMI Calculated 21 48   BSA Calculated 1 44   O2 Saturation 98, RA     Physical Exam    Constitutional   General appearance: No acute distress, well appearing and well nourished  Ears, Nose, Mouth, and Throat   Nasal mucosa, septum, and turbinates: Normal without edema or erythema      Lips, teeth, and gums: Normal, good dentition  Oropharynx: Normal with no erythema, edema, exudate or lesions  Neck   Neck: Supple, symmetric, trachea midline, no masses  Jugular veins: Normal     Pulmonary   Chest: Abnormal   Inspection: overinflated chest  Diminished respiratory excursion bilaterally  Percussion of chest: Abnormal   Percussion of the lungs revealed hyperresonance  Auscultation of lungs: Abnormal   Auscultation of the lungs revealed decreased breath sounds diffusely  no rales or crackles were heard bilaterally  no wheezing  Cardiovascular   Auscultation of heart: Normal rate and rhythm, normal S1 and S2, no murmurs  Examination of extremities for edema and/or varicosities: Normal     Abdomen   Abdomen: Soft, non-tender  Lymphatic   Palpation of lymph nodes in neck: No lymphadenopathy  Musculoskeletal   Gait and station: Normal     Digits and nails: Normal without clubbing or cyanosis  Neurologic   Mental Status: Normal  Not confused, no evidence of dementia, good comprehension, good concentration  Skin   Skin and subcutaneous tissue: Limited exam shows no rash      Psychiatric   Orientation to person, place and time: Normal     Mood and affect: Normal        Signatures   Electronically signed by : LATOYA Fortune ; Jan 28 2016 11:18AM EST                       (Author)

## 2018-01-18 NOTE — MISCELLANEOUS
Message  Message Free Text Note Form: I had a discussion with the patient's daughter about her medical condition  Apparently the patient has had some problems with increased cough and wheezing and bringing up a yellowish mucus  Because of her chronic medical conditions and her fragility she is unable to bring her into the office  Patient's daughter states that usually in the past but Dr  is placed her on an probiotics and increased her prednisone dose  A prescription was sent to the pharmacy for a Zithromax Z-Rogelio to take as directed along with increasing her dosage of prednisone from 5-10 mg daily  The daughter was instructed to continue to follow-up with our office and to call if no improvement in her condition  She was also told if her mother's extremely short of breath the bring her to the emergency room immediately for evaluation  Plan    1  Zithromax Z-Rogelio 250 MG Oral Tablet (Azithromycin); TAKE 2 TABLETS ON DAY 1   THEN TAKE 1 TABLET A DAY FOR 4 DAYS    2   PredniSONE 10 MG Oral Tablet; 1 pill daily    Signatures   Electronically signed by : Willian Banda DO; Jun 29 2016  7:37AM EST                       (Author)

## 2018-01-22 VITALS
SYSTOLIC BLOOD PRESSURE: 132 MMHG | RESPIRATION RATE: 18 BRPM | WEIGHT: 118 LBS | DIASTOLIC BLOOD PRESSURE: 60 MMHG | TEMPERATURE: 97.6 F | HEART RATE: 90 BPM | HEIGHT: 60 IN | OXYGEN SATURATION: 95 % | BODY MASS INDEX: 23.16 KG/M2

## 2018-01-23 NOTE — MISCELLANEOUS
Message  I had a telephone discussion today with this very pleasant 60-year-old female patient  She indicates that she is tired of a struggle to continue on with her severe COPD and is interested in starting on the hospice program for terminal care  I suspect that her life expectancy is under 6 months and this is an appropriate decision on part of the patient  Will place a consultation with the hospice program       Plan  Chronic respiratory failure with hypoxia, COPD, severe    · *1 - [facility]  * this patient has chronic COPD and hypoxia which is  chronic she has decided that she would like to enter into the home hospice program her  life expectancy is under 6 months    Status: Hold For - Scheduling  Requested for:  [id number]  Care Summary provided  : Yes    Signatures   Electronically signed by : LATOYA Glasgow ; Jan 2 2018  2:59PM EST                       (Author)

## 2018-03-07 NOTE — PROGRESS NOTES
Message  Pulmonary PALS Program Enrollment Notification: This patient is enrolled in Pulmonary PALS  Followup call s/w daughter Tricia Horn  Reports Addie feeling better, tired from bday celebrations yesterday  Reminded to call PALS number with any concerns while she is away visiting        Signatures   Electronically signed by : Rocco Borrego, ; Sep 26 2016  3:58PM EST                       (Author)

## 2018-03-07 NOTE — PROGRESS NOTES
Message  Pulmonary PALS Program Enrollment Notification: This patient is enrolled in Pulmonary PALS  Pulmonologist name: [facility]  Current Pulmonary PALS RN is: Dagoberto Dumont [phone]   Discussion at team meeting today re: starting of an AE of copd with low grade temps, increasing cough and yellow sputum  PCP Dr Marla Mcelroy has prescribed steroid and antbx, which pt began last evening  She is "no worse" and does note sputum to be lightening without any low grade fevers today  Her bday is Sunday (81!) and there are plans to go to dinner and then visit at her daughter's home for a few days  She feels she will get more rest at this home than at her own  Will followup by phone with her on Monday        Signatures   Electronically signed by : Beatriz Tomlinson, ; Sep 23 2016  2:34PM EST                       (Author)

## 2018-03-07 NOTE — MISCELLANEOUS
History of Present Illness  HPI: LATE ENTRY FROM 1/6/16: Lm on son [facility]' voicemail by Ila Catherine RRT Requested a return call  COPD Subsequent Hospital Discharge Phone Calls: The patient is being contacted for continued follow-up after hospitalization  The patient was discharged from the hospital on 12/8/2016  Patient was identified as medium risk for readmission per risk stratification  A message was left on voicemail requesting a return call  Current Meds    1  Losartan Potassium 50 MG Oral Tablet; TAKE 1 TABLET DAILY; Therapy: (Recorded:28Mar2014) to Recorded   2  Pantoprazole Sodium 40 MG Oral Tablet Delayed Release; TAKE 1 TABLET DAILY; Therapy: (Recorded:28Mar2014) to Recorded   3  PARoxetine HCl - 20 MG Oral Tablet; TAKE 1 TABLET DAILY; Therapy: (Recorded:28Mar2014) to Recorded   4  Vitamin C CAPS; Therapy: (Recorded:28Mar2014) to Recorded   5  Xanax 0 5 MG Oral Tablet (ALPRAZolam); Therapy: (Recorded:28Mar2014) to Recorded   6  Xopenex NEBU (Levalbuterol HCl); Therapy: (Recorded:28Mar2014) to Recorded    Allergies    1  Sulfa Drugs    Future Appointments    Date/Time Provider Specialty Site   01/28/2016 09:40 AM LATOYA Serrano   Pulmonary Medicine [facility]     Signatures   Electronically signed by : Fadia Smith, ; Seth 15 2016 12:36PM EST                       (Author)

## 2018-04-02 ENCOUNTER — TELEPHONE (OUTPATIENT)
Dept: INTERNAL MEDICINE CLINIC | Facility: CLINIC | Age: 83
End: 2018-04-02

## 2018-04-02 NOTE — TELEPHONE ENCOUNTER
Patients daughter called  She wants to talk to you about her meds and some problems shes been having     She is on hospice but you are listed as her   Says she is itching really bad and the benadryl and cream she is using is not helping. Wants to know if there is something else she can get?

## 2018-04-02 NOTE — TELEPHONE ENCOUNTER
Patient's daughter called she is concerned about the dose of steroids that the patient is on to help her breathing she is concerned because the patient is starting to show signs of fluid retention    The hospice nurses due to evaluate the patient tomorrow and I asked the family to have her call me when she is out to look at [employer]

## 2018-04-05 ENCOUNTER — TELEPHONE (OUTPATIENT)
Dept: INTERNAL MEDICINE CLINIC | Facility: CLINIC | Age: 83
End: 2018-04-05

## 2018-04-05 NOTE — TELEPHONE ENCOUNTER
Luis Antonio Graham from hospice would like to talk to you reguarding this patient,she can be reached at [phone]

## 2018-04-16 ENCOUNTER — TELEPHONE (OUTPATIENT)
Dept: INTERNAL MEDICINE CLINIC | Facility: CLINIC | Age: 83
End: 2018-04-16

## 2018-04-16 NOTE — TELEPHONE ENCOUNTER
Gianluca Cartagena from [facility] [place] [facility] asked for clarification regarding dose of prednisone  She also asked that an antibiotic be prescribed for the Pt  She can be reached at [phone], thank you

## 2018-04-24 ENCOUNTER — TELEPHONE (OUTPATIENT)
Dept: INTERNAL MEDICINE CLINIC | Facility: CLINIC | Age: 83
End: 2018-04-24

## 2018-04-26 ENCOUNTER — TELEPHONE (OUTPATIENT)
Dept: INTERNAL MEDICINE CLINIC | Facility: CLINIC | Age: 83
End: 2018-04-26

## 2018-04-26 DIAGNOSIS — H66.90 EAR INFECTION: Primary | ICD-10-CM

## 2018-04-26 RX ORDER — AZITHROMYCIN 250 MG/1
TABLET, FILM COATED ORAL
Qty: 6 TABLET | Refills: 0 | Status: SHIPPED | OUTPATIENT
Start: 2018-04-26 | End: 2018-04-30

## 2018-04-26 NOTE — TELEPHONE ENCOUNTER
The [facility] nurse called today the patient has symptoms consistent with a ear infection and we have prescribed a 5 day Zithromax pack follow-up if symptoms do not resolve